# Patient Record
Sex: MALE | Race: WHITE | NOT HISPANIC OR LATINO | Employment: OTHER | URBAN - METROPOLITAN AREA
[De-identification: names, ages, dates, MRNs, and addresses within clinical notes are randomized per-mention and may not be internally consistent; named-entity substitution may affect disease eponyms.]

---

## 2018-10-04 ENCOUNTER — HOSPITAL ENCOUNTER (OUTPATIENT)
Dept: CT IMAGING | Facility: CLINIC | Age: 58
Discharge: HOME | End: 2018-10-04
Admitting: RADIOLOGY
Payer: COMMERCIAL

## 2018-10-04 DIAGNOSIS — Z82.49 FAMILY HISTORY OF HEART DISEASE: ICD-10-CM

## 2018-10-04 PROCEDURE — 75571 CT HRT W/O DYE W/CA TEST: CPT | Mod: GA

## 2018-10-04 PROCEDURE — 75571 CT HRT W/O DYE W/CA TEST: CPT | Mod: 26 | Performed by: INTERNAL MEDICINE

## 2021-10-01 ENCOUNTER — HOSPITAL ENCOUNTER (EMERGENCY)
Facility: CLINIC | Age: 61
Discharge: HOME | End: 2021-10-01
Attending: EMERGENCY MEDICINE | Admitting: EMERGENCY MEDICINE
Payer: COMMERCIAL

## 2021-10-01 VITALS
SYSTOLIC BLOOD PRESSURE: 140 MMHG | RESPIRATION RATE: 16 BRPM | TEMPERATURE: 98.5 F | OXYGEN SATURATION: 94 % | WEIGHT: 205 LBS | DIASTOLIC BLOOD PRESSURE: 97 MMHG | HEART RATE: 67 BPM

## 2021-10-01 DIAGNOSIS — I10 ESSENTIAL HYPERTENSION: ICD-10-CM

## 2021-10-01 DIAGNOSIS — R51.9 NONINTRACTABLE EPISODIC HEADACHE, UNSPECIFIED HEADACHE TYPE: ICD-10-CM

## 2021-10-01 LAB
ANION GAP SERPL CALCULATED.3IONS-SCNC: 6 MMOL/L (ref 3–14)
BASOPHILS # BLD AUTO: 0 10E3/UL (ref 0–0.2)
BASOPHILS NFR BLD AUTO: 1 %
BUN SERPL-MCNC: 11 MG/DL (ref 7–30)
CALCIUM SERPL-MCNC: 9.7 MG/DL (ref 8.5–10.1)
CHLORIDE BLD-SCNC: 100 MMOL/L (ref 94–109)
CO2 SERPL-SCNC: 29 MMOL/L (ref 20–32)
CREAT SERPL-MCNC: 0.94 MG/DL (ref 0.66–1.25)
EOSINOPHIL # BLD AUTO: 0 10E3/UL (ref 0–0.7)
EOSINOPHIL NFR BLD AUTO: 0 %
ERYTHROCYTE [DISTWIDTH] IN BLOOD BY AUTOMATED COUNT: 11.9 % (ref 10–15)
GFR SERPL CREATININE-BSD FRML MDRD: 88 ML/MIN/1.73M2
GLUCOSE BLD-MCNC: 183 MG/DL (ref 70–99)
HCT VFR BLD AUTO: 53.1 % (ref 40–53)
HGB BLD-MCNC: 18.6 G/DL (ref 13.3–17.7)
IMM GRANULOCYTES # BLD: 0 10E3/UL
IMM GRANULOCYTES NFR BLD: 1 %
LYMPHOCYTES # BLD AUTO: 1.3 10E3/UL (ref 0.8–5.3)
LYMPHOCYTES NFR BLD AUTO: 15 %
MCH RBC QN AUTO: 30 PG (ref 26.5–33)
MCHC RBC AUTO-ENTMCNC: 35 G/DL (ref 31.5–36.5)
MCV RBC AUTO: 86 FL (ref 78–100)
MONOCYTES # BLD AUTO: 0.4 10E3/UL (ref 0–1.3)
MONOCYTES NFR BLD AUTO: 5 %
NEUTROPHILS # BLD AUTO: 6.9 10E3/UL (ref 1.6–8.3)
NEUTROPHILS NFR BLD AUTO: 78 %
NRBC # BLD AUTO: 0 10E3/UL
NRBC BLD AUTO-RTO: 0 /100
PLATELET # BLD AUTO: 296 10E3/UL (ref 150–450)
POTASSIUM BLD-SCNC: 3.6 MMOL/L (ref 3.4–5.3)
RBC # BLD AUTO: 6.19 10E6/UL (ref 4.4–5.9)
SODIUM SERPL-SCNC: 135 MMOL/L (ref 133–144)
WBC # BLD AUTO: 8.7 10E3/UL (ref 4–11)

## 2021-10-01 PROCEDURE — 80048 BASIC METABOLIC PNL TOTAL CA: CPT | Performed by: EMERGENCY MEDICINE

## 2021-10-01 PROCEDURE — 93005 ELECTROCARDIOGRAM TRACING: CPT

## 2021-10-01 PROCEDURE — 85025 COMPLETE CBC W/AUTO DIFF WBC: CPT | Performed by: EMERGENCY MEDICINE

## 2021-10-01 PROCEDURE — 99284 EMERGENCY DEPT VISIT MOD MDM: CPT | Mod: 25

## 2021-10-01 PROCEDURE — 96375 TX/PRO/DX INJ NEW DRUG ADDON: CPT

## 2021-10-01 PROCEDURE — 250N000011 HC RX IP 250 OP 636: Performed by: EMERGENCY MEDICINE

## 2021-10-01 PROCEDURE — 96374 THER/PROPH/DIAG INJ IV PUSH: CPT

## 2021-10-01 PROCEDURE — 36415 COLL VENOUS BLD VENIPUNCTURE: CPT | Performed by: EMERGENCY MEDICINE

## 2021-10-01 RX ORDER — DIPHENHYDRAMINE HYDROCHLORIDE 50 MG/ML
25 INJECTION INTRAMUSCULAR; INTRAVENOUS ONCE
Status: COMPLETED | OUTPATIENT
Start: 2021-10-01 | End: 2021-10-01

## 2021-10-01 RX ORDER — METOCLOPRAMIDE HYDROCHLORIDE 5 MG/ML
10 INJECTION INTRAMUSCULAR; INTRAVENOUS ONCE
Status: COMPLETED | OUTPATIENT
Start: 2021-10-01 | End: 2021-10-01

## 2021-10-01 RX ORDER — METOCLOPRAMIDE 10 MG/1
10 TABLET ORAL 4 TIMES DAILY PRN
Qty: 10 TABLET | Refills: 0 | Status: SHIPPED | OUTPATIENT
Start: 2021-10-01

## 2021-10-01 RX ADMIN — METOCLOPRAMIDE HYDROCHLORIDE 10 MG: 5 INJECTION INTRAMUSCULAR; INTRAVENOUS at 15:30

## 2021-10-01 RX ADMIN — DIPHENHYDRAMINE HYDROCHLORIDE 25 MG: 50 INJECTION INTRAMUSCULAR; INTRAVENOUS at 15:29

## 2021-10-01 ASSESSMENT — ENCOUNTER SYMPTOMS
WEAKNESS: 0
NAUSEA: 1
VOMITING: 1
HEADACHES: 1
FEVER: 0
EYE PAIN: 0

## 2021-10-01 NOTE — ED TRIAGE NOTES
A&O x4.  ABC's intact.      Pt arrives with c/o high blood pressure & headache this morning at 0230 this morning.  Did take blood pressure medication.

## 2021-10-01 NOTE — ED PROVIDER NOTES
History     Chief Complaint:  Hypertension      The history is provided by the patient.      Akhil Downs is a 60 year old male who presents with hypertension. At 0230 this morning, the patient woke up to the gradual onset of a headache and it felt similar to prior episodes of hypertension related headaches. He notes that the previous headache episodes also occured at night. The headache is located on the forehead and posterior head. At bedside, he is still feeling the headache although less severe and mild nausea. Additionally, he had 2 episodes of vomiting since his headache started.  He denies any associated fevers, numbness, weakness, eye pain or jaw claudication.      He notes taking his blood pressure medications today. He was previously prescribed Atenolol, but was taken off this medication months prior. The patient admits to drinking 10 cups of coffee a day, but denies use of tobacco products or methamphetamine use. He also denies fever, eye pain, chest pain, breathing difficulties, or weakness to a particular area.       Review of Systems   Constitutional: Negative for fever.   Eyes: Negative for pain.   Cardiovascular: Negative for chest pain.   Gastrointestinal: Positive for nausea and vomiting.   Neurological: Positive for headaches. Negative for weakness.   All other systems reviewed and are negative.      Allergies:  Amlodipine  Atorvastatin  Contrast Dye  Lisinopril  Lovastatin  Pravastatin    Medications:    Tenormin   Hyzaar   Crestor   Viagra   Flomax     Past Medical History:    Type 2 diabetes mellitus   ED   Obesity   Elevated PSA   Dyslipidemia   BPH   Umbilical hernia   Essential HTN   Sleep apnea     Past Surgical History:    Appendectomy   Vasectomy     Family History:    Mother - seizure disorder   Brother - congenital heart defect     Social History:  Patient was accompanied to the ED with his wife.  Hx of alcohol use     Physical Exam     Patient Vitals for the past 24 hrs:   BP  Temp Temp src Pulse Resp SpO2 Weight   10/01/21 1600 -- -- -- -- -- 94 % --   10/01/21 1545 -- -- -- -- -- 96 % --   10/01/21 1538 (!) 156/104 -- -- 71 16 -- --   10/01/21 1530 (!) 156/104 -- -- 68 -- 97 % --   10/01/21 1359 (!) 162/109 98.5  F (36.9  C) Oral 68 18 96 % 93 kg (205 lb)       Physical Exam    HEENT:   Temporal arteries are non-tender.      Oropharynx is moist, without lesions or trismus.  Eyes:   PERRL.  EOMs intact.      No corneal clouding.   NECK:   Supple, no meningismus.       Negative Brudzinski's sign.  CV:    Regular rate and rhythm.    No murmurs, rubs or gallops.    No peripheral edema or unilateral leg swelling.  PULM:   Clear to auscultation bilateral.      No respiratory distress.      No stridor or wheezing.  ABD:  Soft, non-tender, non-distended.      No rebound or guarding.  MSK:    No gross deformity to all four extremities.      No significant joint effusions.  LYMPH:  No cervical lymphadenopathy.  NEURO:  A & O x 3    CN II-XII intact, speech is clear with no aphasia.      Finger to nose within normal limits.  No pronator drift.      Strength is 5/5 in all 4 extremities.  Sensation is intact.      Normal muscular tone, no tremor.  SKIN:   Warm, dry and intact.    PSYCH:   Mood is good and affect is appropriate.      Emergency Department Course   EC  ECG taken at 1407, ECG read at 1410  Normal sinus rhythm   Normal ECG   Rate 66 bpm. DE interval 174 ms. QRS duration 88 ms. QT/QTc 426/446 ms. P-R-T axes 53 37 12.     Laboratory:  CBC: WBC 8.7, HGB 18.6 (H),     BMP: Glucose 183 (H) o/w WNL (Creatinine 0.94)     Emergency Department Course:    Reviewed:  I reviewed nursing notes, vitals, past history and care everywhere    Assessments:  1430 I obtained history and examined the patient as noted above.   1613 I rechecked the patient and explained findings. I discussed plans for discharge home.      Interventions:  1529 Benadryl 10 mg IV   1530 Reglan 10 mg IV      Disposition:  The patient was discharged to home.    Impression & Plan    Medical Decision Makin-year-old male presented to the ED with gradual onset of headache in the presence of uncontrolled hypertension.  He has no features concerning for intracranial hemorrhage, mass, dural sinus thrombosis, meningitis, glaucoma, temporal arteritis.  History is most suggestive of migraine headache.  He had near complete resolution of his headache with interventions as described above.      In regards to his hypertension, he has no evidence of endorgan damage.  He is on max dose losartan/hydrochlorothiazide.  He was previously on atenolol.  I will have him reinitiate atenolol for improved blood pressure control.  He was also counseled on caffeine reduction as this is likely contributing to his poorly controlled hypertension.  Close follow-up with PCP and return to ED for any worsening symptoms.    Covid-19  Akhil Downs was evaluated during a global COVID-19 pandemic, which necessitated consideration that the patient might be at risk for infection with the SARS-CoV-2 virus that causes COVID-19.   Applicable protocols for evaluation were followed during the patient's care.       Diagnosis:    ICD-10-CM    1. Nonintractable episodic headache, unspecified headache type  R51.9    2. Essential hypertension  I10        Discharge Medications:  New Prescriptions    No medications on file         Scribe Disclosure:  I, Timo Scott, am serving as a scribe at 2:22 PM on 10/1/2021 to document services personally performed by Jayce Garcia MD based on my observations and the provider's statements to me.      Jayce Garcia MD  10/02/21 0858

## 2021-10-01 NOTE — DISCHARGE INSTRUCTIONS
Please restart your atenolol 50 mg once a day.  Please continue your other medications as previously prescribed.    Check your blood pressure twice a day and document these numbers.  When you follow-up with your primary care physician, please provide the physician with your blood pressures.    Discharge Instructions  Hypertension - High Blood Pressure    During you visit to the Emergency Department, your blood pressure was higher than the recommended blood pressure.  This may be related to stress, pain, medication or other temporary conditions. In these cases, your blood pressure may return to normal on its own. If you have a history of high blood pressure, you may need to have your provider adjust your medications. Sometimes, your high measurement here may indicate that you have developed high blood pressure that will stay high unless it is treated. As a general rule, high blood pressure causes problems over years rather than days, weeks, or months. So, while it is important to treat blood pressure, it is rarely important to treat blood pressure immediately. Occasionally we will begin a medication in the Emergency Department; more often we will recommend close follow-up for medications with a primary doctor/clinic.    Generally, every Emergency Department visit should have a follow-up clinic visit with either a primary or a specialty clinic/provider. Please follow-up as instructed by your emergency provider today.    Return to the Emergency Department if you start to have:  A severe headache.  Chest pain.  Shortness of breath.  Weakness or numbness that affects one part of the body.  Confusion.  Vision changes.  Significant swelling of legs and/or eyes.  A reaction to any medication started in the Emergency Department.    What can I do to help myself?  Avoid alcohol.  Take any blood pressure medicine that you are prescribed.  Get a good night s sleep.  Lower your salt intake.  Exercise.  Lose weight.  Manage  stress.  See your doctor regularly    If blood pressure medication was started in the Emergency Department:  The medicine may not have an immediate effect. The body and brain determine what blood pressure you have. The medicine s job is to retrain the body s  thermostat  to a lower blood pressure.  You will need to follow up with your provider to see how this medicine is working for you.  If you were given a prescription for medicine here today, be sure to read all of the information (including the package insert) that comes with your prescription.  This will include important information about the medicine, its side effects, and any warnings that you need to know about.  The pharmacist who fills the prescription can provide more information and answer questions you may have about the medicine.  If you have questions or concerns that the pharmacist cannot address, please call or return to the Emergency Department.   Remember that you can always come back to the Emergency Department if you are not able to see your regular provider in the amount of time listed above, if you get any new symptoms, or if there is anything that worries you.  Discharge Instructions  Headache    You were seen today for a headache. Headaches may be caused by many different things such as muscle tension, sinus inflammation, anxiety and stress, having too little sleep, too much alcohol, some medical conditions or injury. You may have a migraine, which is caused by changes in the blood vessels in your head.  At this time your provider does not find that your headache is a sign of anything dangerous or life-threatening.  However, sometimes the signs of serious illness do not show up right away.      Generally, every Emergency Department visit should have a follow-up clinic visit with either a primary or a specialty clinic/provider. Please follow-up as instructed by your emergency provider today.    Return to the Emergency Department if:  You get  a new fever of 100.4 F or higher.  Your headache gets much worse.  You get a stiff neck with your headache.  You get a new headache that is significantly different or worse than headaches you have had before.  You are vomiting (throwing up) and cannot keep food or water down.  You have blurry or double vision or other problems with your eyes.  You have a new weakness on one side of your body.  You have difficulty with balance which is new.  You or your family thinks you are confused.  You have a seizure.    What can I do to help myself?  Pain medications - You may take a pain medication such as Tylenol  (acetaminophen), Advil , Motrin  (ibuprofen) or Aleve  (naproxen).  Take a pain reliever as soon as you notice symptoms.  Starting medications as soon as you start to have symptoms may lessen the amount of pain you have.  Relaxing in a quiet, dark room may help.  Get enough sleep and eat meals regularly.  You may need to watch for certain foods or other things which may trigger your headaches.  Keeping a journal of your headaches and possible triggers may help you and your primary provider to identify things which you should avoid which may be causing your headaches.  If you were given a prescription for medicine here today, be sure to read all of the information (including the package insert) that comes with your prescription.  This will include important information about the medicine, its side effects, and any warnings that you need to know about.  The pharmacist who fills the prescription can provide more information and answer questions you may have about the medicine.  If you have questions or concerns that the pharmacist cannot address, please call or return to the Emergency Department.   Remember that you can always come back to the Emergency Department if you are not able to see your regular provider in the amount of time listed above, if you get any new symptoms, or if there is anything that worries  you.

## 2021-10-04 LAB
ATRIAL RATE - MUSE: 66 BPM
DIASTOLIC BLOOD PRESSURE - MUSE: NORMAL MMHG
INTERPRETATION ECG - MUSE: NORMAL
P AXIS - MUSE: 53 DEGREES
PR INTERVAL - MUSE: 174 MS
QRS DURATION - MUSE: 88 MS
QT - MUSE: 426 MS
QTC - MUSE: 446 MS
R AXIS - MUSE: 37 DEGREES
SYSTOLIC BLOOD PRESSURE - MUSE: NORMAL MMHG
T AXIS - MUSE: 12 DEGREES
VENTRICULAR RATE- MUSE: 66 BPM

## 2022-08-11 ENCOUNTER — HOSPITAL ENCOUNTER (EMERGENCY)
Facility: CLINIC | Age: 62
Discharge: HOME | End: 2022-08-12
Attending: EMERGENCY MEDICINE | Admitting: EMERGENCY MEDICINE
Payer: COMMERCIAL

## 2022-08-11 DIAGNOSIS — D72.829 LEUKOCYTOSIS, UNSPECIFIED TYPE: ICD-10-CM

## 2022-08-11 DIAGNOSIS — E86.0 DEHYDRATION: ICD-10-CM

## 2022-08-11 DIAGNOSIS — I95.9 HYPOTENSION, UNSPECIFIED HYPOTENSION TYPE: ICD-10-CM

## 2022-08-11 LAB
BASOPHILS # BLD AUTO: 0 10E3/UL (ref 0–0.2)
BASOPHILS NFR BLD AUTO: 0 %
EOSINOPHIL # BLD AUTO: 0 10E3/UL (ref 0–0.7)
EOSINOPHIL NFR BLD AUTO: 0 %
ERYTHROCYTE [DISTWIDTH] IN BLOOD BY AUTOMATED COUNT: 12 % (ref 10–15)
HCT VFR BLD AUTO: 48.9 % (ref 40–53)
HGB BLD-MCNC: 16.4 G/DL (ref 13.3–17.7)
IMM GRANULOCYTES # BLD: 0.1 10E3/UL
IMM GRANULOCYTES NFR BLD: 1 %
LYMPHOCYTES # BLD AUTO: 1.4 10E3/UL (ref 0.8–5.3)
LYMPHOCYTES NFR BLD AUTO: 7 %
MCH RBC QN AUTO: 28.9 PG (ref 26.5–33)
MCHC RBC AUTO-ENTMCNC: 33.5 G/DL (ref 31.5–36.5)
MCV RBC AUTO: 86 FL (ref 78–100)
MONOCYTES # BLD AUTO: 0.9 10E3/UL (ref 0–1.3)
MONOCYTES NFR BLD AUTO: 5 %
NEUTROPHILS # BLD AUTO: 16.2 10E3/UL (ref 1.6–8.3)
NEUTROPHILS NFR BLD AUTO: 87 %
NRBC # BLD AUTO: 0 10E3/UL
NRBC BLD AUTO-RTO: 0 /100
PLATELET # BLD AUTO: 273 10E3/UL (ref 150–450)
RBC # BLD AUTO: 5.67 10E6/UL (ref 4.4–5.9)
WBC # BLD AUTO: 18.7 10E3/UL (ref 4–11)

## 2022-08-11 PROCEDURE — 80048 BASIC METABOLIC PNL TOTAL CA: CPT | Performed by: EMERGENCY MEDICINE

## 2022-08-11 PROCEDURE — 93005 ELECTROCARDIOGRAM TRACING: CPT

## 2022-08-11 PROCEDURE — 258N000003 HC RX IP 258 OP 636: Performed by: EMERGENCY MEDICINE

## 2022-08-11 PROCEDURE — 96360 HYDRATION IV INFUSION INIT: CPT

## 2022-08-11 PROCEDURE — 99285 EMERGENCY DEPT VISIT HI MDM: CPT | Mod: 25

## 2022-08-11 PROCEDURE — 84484 ASSAY OF TROPONIN QUANT: CPT | Performed by: EMERGENCY MEDICINE

## 2022-08-11 PROCEDURE — 36415 COLL VENOUS BLD VENIPUNCTURE: CPT | Performed by: EMERGENCY MEDICINE

## 2022-08-11 PROCEDURE — 85025 COMPLETE CBC W/AUTO DIFF WBC: CPT | Performed by: EMERGENCY MEDICINE

## 2022-08-11 RX ADMIN — SODIUM CHLORIDE 1000 ML: 9 INJECTION, SOLUTION INTRAVENOUS at 23:28

## 2022-08-11 ASSESSMENT — ENCOUNTER SYMPTOMS
SHORTNESS OF BREATH: 0
LIGHT-HEADEDNESS: 1
NAUSEA: 1
DIZZINESS: 1

## 2022-08-11 ASSESSMENT — ACTIVITIES OF DAILY LIVING (ADL): ADLS_ACUITY_SCORE: 35

## 2022-08-12 ENCOUNTER — APPOINTMENT (OUTPATIENT)
Dept: GENERAL RADIOLOGY | Facility: CLINIC | Age: 62
End: 2022-08-12
Attending: EMERGENCY MEDICINE
Payer: COMMERCIAL

## 2022-08-12 VITALS
HEART RATE: 86 BPM | TEMPERATURE: 98.1 F | WEIGHT: 201.8 LBS | OXYGEN SATURATION: 94 % | RESPIRATION RATE: 18 BRPM | SYSTOLIC BLOOD PRESSURE: 106 MMHG | DIASTOLIC BLOOD PRESSURE: 50 MMHG

## 2022-08-12 LAB
ALBUMIN UR-MCNC: 70 MG/DL
ANION GAP SERPL CALCULATED.3IONS-SCNC: 10 MMOL/L (ref 7–15)
APPEARANCE UR: ABNORMAL
ATRIAL RATE - MUSE: 93 BPM
BILIRUB UR QL STRIP: NEGATIVE
BUN SERPL-MCNC: 17.5 MG/DL (ref 8–23)
CALCIUM SERPL-MCNC: 9.4 MG/DL (ref 8.8–10.2)
CAOX CRY #/AREA URNS HPF: ABNORMAL /HPF
CHLORIDE SERPL-SCNC: 99 MMOL/L (ref 98–107)
COLOR UR AUTO: YELLOW
CREAT SERPL-MCNC: 1.42 MG/DL (ref 0.67–1.17)
DEPRECATED HCO3 PLAS-SCNC: 25 MMOL/L (ref 22–29)
DIASTOLIC BLOOD PRESSURE - MUSE: NORMAL MMHG
GFR SERPL CREATININE-BSD FRML MDRD: 57 ML/MIN/1.73M2
GLUCOSE SERPL-MCNC: 209 MG/DL (ref 70–99)
GLUCOSE UR STRIP-MCNC: NEGATIVE MG/DL
HCO3 BLDV-SCNC: 28 MMOL/L (ref 21–28)
HGB UR QL STRIP: NEGATIVE
HYALINE CASTS: 20 /LPF
INTERPRETATION ECG - MUSE: NORMAL
KETONES UR STRIP-MCNC: 10 MG/DL
LACTATE BLD-SCNC: 1.4 MMOL/L
LEUKOCYTE ESTERASE UR QL STRIP: NEGATIVE
MUCOUS THREADS #/AREA URNS LPF: PRESENT /LPF
NITRATE UR QL: NEGATIVE
P AXIS - MUSE: 52 DEGREES
PCO2 BLDV: 44 MM HG (ref 40–50)
PH BLDV: 7.41 [PH] (ref 7.32–7.43)
PH UR STRIP: 6 [PH] (ref 5–7)
PO2 BLDV: 47 MM HG (ref 25–47)
POTASSIUM SERPL-SCNC: 4 MMOL/L (ref 3.4–5.3)
PR INTERVAL - MUSE: 166 MS
QRS DURATION - MUSE: 80 MS
QT - MUSE: 368 MS
QTC - MUSE: 457 MS
R AXIS - MUSE: 26 DEGREES
RBC URINE: 15 /HPF
SAO2 % BLDV: 82 % (ref 94–100)
SODIUM SERPL-SCNC: 134 MMOL/L (ref 136–145)
SP GR UR STRIP: 1.02 (ref 1–1.03)
SQUAMOUS EPITHELIAL: 1 /HPF
SYSTOLIC BLOOD PRESSURE - MUSE: NORMAL MMHG
T AXIS - MUSE: 18 DEGREES
TROPONIN T SERPL HS-MCNC: 33 NG/L
TROPONIN T SERPL HS-MCNC: 38 NG/L
UROBILINOGEN UR STRIP-MCNC: NORMAL MG/DL
VENTRICULAR RATE- MUSE: 93 BPM
WBC URINE: 7 /HPF

## 2022-08-12 PROCEDURE — 83605 ASSAY OF LACTIC ACID: CPT

## 2022-08-12 PROCEDURE — 87086 URINE CULTURE/COLONY COUNT: CPT | Performed by: EMERGENCY MEDICINE

## 2022-08-12 PROCEDURE — 71046 X-RAY EXAM CHEST 2 VIEWS: CPT

## 2022-08-12 PROCEDURE — 81001 URINALYSIS AUTO W/SCOPE: CPT | Performed by: EMERGENCY MEDICINE

## 2022-08-12 PROCEDURE — 36415 COLL VENOUS BLD VENIPUNCTURE: CPT | Performed by: EMERGENCY MEDICINE

## 2022-08-12 PROCEDURE — 84484 ASSAY OF TROPONIN QUANT: CPT | Performed by: EMERGENCY MEDICINE

## 2022-08-12 PROCEDURE — 82803 BLOOD GASES ANY COMBINATION: CPT

## 2022-08-12 ASSESSMENT — ACTIVITIES OF DAILY LIVING (ADL): ADLS_ACUITY_SCORE: 35

## 2022-08-12 NOTE — DISCHARGE INSTRUCTIONS
Hold you blood pressure medication tomorrow.  If blood pressure normalizes, may resume Saturday.  Drink plenty of fluids.  Gentle walking and normal daily activity is okay however is avoid strenuous exercise until cleared by your doctor.  Your white blood cell count was elevated.  If any symptoms of infection fever cough congestion abdominal pain recurrent low blood pressure, return immediately to the emergency department.  Follow-up closely with your primary care doctor regarding any adjustments in your blood pressure medication.    Discuss stress test with your doctor.

## 2022-08-12 NOTE — ED TRIAGE NOTES
Patient states he went on a bike ride tonight for 50mins and about 9miles.  Patient states he has had issues in the past with low blood pressure after bike riding. Tonight he felt lightheaded after his ride and took his blood pressure which was 58/43. In triage blood pressure 95/58. Patient feels mildly dizzy in triage.      Triage Assessment     Row Name 08/11/22 2031       Triage Assessment (Adult)    Airway WDL WDL       Respiratory WDL    Respiratory WDL WDL       Skin Circulation/Temperature WDL    Skin Circulation/Temperature WDL WDL       Cardiac WDL    Cardiac WDL WDL       Peripheral/Neurovascular WDL    Peripheral Neurovascular WDL WDL       Cognitive/Neuro/Behavioral WDL    Cognitive/Neuro/Behavioral WDL WDL

## 2022-08-12 NOTE — ED PROVIDER NOTES
History   Chief Complaint:  Hypotension       HPI   Akhil Downs is a 61 year old male with history of hypertension who presents with hypotension, dizziness, nausea, and lightheadedness after his bike ride today. He went on a 9.5 mile bike ride at 6:30pm today. During his bike ride he felt fine. This is a normal bike ride for him. When he was walking back a about 7:15pm he felt extremely exhausted. He measured his blood pressure and it was 50s/43 on his left arm. When he got home he had constant dizziness and lightheadedness with a little nausea. He denies trauma. He denies chest pain or shortness of breath during the bike ride or now. He has had this happen in the past after he stopped taking beta blockers. He is planning on loosing weight and wants to get his meds and blood pressure under control.   Patient had his wellness exam today.  He had fasted since last night for his laboratory studies.  Following his appointment, he was disappointed in his laboratory numbers and had only a few bites of food today.  He has been taking his losartan hydrochlorothiazide as prescribed for his blood pressure.  No recent medication changes.  No recent infectious symptoms.  Denies fever cough abdominal pain or urinary symptoms.  Recently had COVID infection however had recovered from this.    Review of Systems   Respiratory: Negative for shortness of breath.    Cardiovascular: Negative for chest pain.   Gastrointestinal: Positive for nausea.   Neurological: Positive for dizziness and light-headedness.   All other systems reviewed and are negative.        Allergies:  Amlodipine  Atorvastatin  Contrast Dye  Lisinopril  Lovastatin  Pravastatin    Medications:  Cialis  Flomax  Hyzaar  Crestor  Glucophage    Past Medical History:     DM 2  ED  Dyslipidemia  Benign prostatic hyperplasia  Umbilical hernia  Hypertension  GERD  Nephrolithiasis    Past Surgical History:    Appendectomy  Vasectomy    Family History:    Seizure  disorder  Congenital heart defect    Social History:  The patient presents to the ED with wife    Physical Exam     Patient Vitals for the past 24 hrs:   BP Temp Temp src Pulse Resp SpO2 Weight   08/12/22 0130 100/68 -- -- 86 -- 92 % --   08/12/22 0115 -- -- -- -- -- 94 % --   08/12/22 0045 -- -- -- -- -- 93 % --   08/12/22 0030 105/69 -- -- 84 -- 92 % --   08/12/22 0015 -- -- -- -- -- 94 % --   08/12/22 0000 97/66 -- -- 83 -- 93 % --   08/11/22 2150 110/71 -- -- 92 -- 99 % --   08/11/22 2030 95/58 98.1  F (36.7  C) Oral 94 18 95 % 91.5 kg (201 lb 12.8 oz)       Physical Exam  Gen: alert  HEENT: PERRL, oropharynx clear  Neck: normal ROM  CV: RRR, no murmurs, 2+ distal pulses in all 4 extremities  Chest: no tenderness over the chest wall  Pulm: breath sounds equal, lungs clear  Abd: Soft, nontender  Back: no evidence of injury  MSK: no lower extremity edema, no calf tenderness  Skin: no rash  Neuro: alert, appropriate conversation and interaction    Emergency Department Course   ECG  ECG taken at 2038, ECG read at 2222  Normal sinus rhythm  Nonspecific T wave abnormality   Rate 93 bpm. LA interval 166 ms. QRS duration 80 ms. QT/QTc 368/457 ms. P-R-T axes 52 26 18.     Imaging:  Chest XR,  PA & LAT   Final Result   IMPRESSION: Negative chest.        Report per radiology    Laboratory:  Labs Ordered and Resulted from Time of ED Arrival to Time of ED Departure   BASIC METABOLIC PANEL - Abnormal       Result Value    Creatinine 1.42 (*)     Sodium 134 (*)     Potassium 4.0      Urea Nitrogen 17.5      Chloride 99      Carbon Dioxide (CO2) 25      Anion Gap 10      Glucose 209 (*)     GFR Estimate 57 (*)     Calcium 9.4     TROPONIN T, HIGH SENSITIVITY - Abnormal    Troponin T, High Sensitivity 38 (*)    CBC WITH PLATELETS AND DIFFERENTIAL - Abnormal    WBC Count 18.7 (*)     RBC Count 5.67      Hemoglobin 16.4      Hematocrit 48.9      MCV 86      MCH 28.9      MCHC 33.5      RDW 12.0      Platelet Count 273      %  Neutrophils 87      % Lymphocytes 7      % Monocytes 5      % Eosinophils 0      % Basophils 0      % Immature Granulocytes 1      NRBCs per 100 WBC 0      Absolute Neutrophils 16.2 (*)     Absolute Lymphocytes 1.4      Absolute Monocytes 0.9      Absolute Eosinophils 0.0      Absolute Basophils 0.0      Absolute Immature Granulocytes 0.1      Absolute NRBCs 0.0     ROUTINE UA WITH MICROSCOPIC - Abnormal    Color Urine Yellow      Appearance Urine Slightly Cloudy (*)     Glucose Urine Negative      Bilirubin Urine Negative      Ketones Urine 10  (*)     Specific Gravity Urine 1.025      Blood Urine Negative      pH Urine 6.0      Protein Albumin Urine 70  (*)     Urobilinogen Urine Normal      Nitrite Urine Negative      Leukocyte Esterase Urine Negative      Mucus Urine Present (*)     Calcium Oxalate Crystals Urine Few (*)     RBC Urine 15 (*)     WBC Urine 7 (*)     Squamous Epithelials Urine 1      Hyaline Casts Urine 20 (*)    ISTAT GASES LACTATE VENOUS POCT - Abnormal    Lactic Acid POCT 1.4      Bicarbonate Venous POCT 28      O2 Sat, Venous POCT 82 (*)     pCO2V Venous POCT 44      pH Venous POCT 7.41      pO2 Venous POCT 47     TROPONIN T, HIGH SENSITIVITY - Abnormal    Troponin T, High Sensitivity 33 (*)    URINE CULTURE      Emergency Department Course:    Reviewed:  I reviewed nursing notes, vitals, past medical history and Care Everywhere    Assessments:  2220 I obtained history and examined the patient as noted above.     0117 I rechecked the patient and explained findings.     0143 I rechecked the patient. He is feeling better. I recommend a second liter of fluid. He declined this and understand the risk of infection.    Interventions:  2338 NaCl Bolus 1,000 mL IV    Disposition:  The patient was discharged to home.     Impression & Plan     Medical Decision Making:  Patient presents for low blood pressure.  EKG shows sinus rhythm without evidence of arrhythmia.  Troponin and delta troponin detectable  however he no significant delta change.  Patient did not have chest pain or shortness of breath.  Leukocytosis noted.  No overt infectious symptoms.  UA bland.  Chest x-ray clear.  Abdominal exam is completely benign.  I discussed with the patient that hypotension and leukocytosis can both be concerning for systemic infection.  Lactic acid here is normal.  I recommended cultures of the blood and urine.  Patient declined blood cultures.  He understands that this might miss a systemic infection and bloodstream infection.  Urine culture pending.  I suspect that the underlying cause of the patient's presentation is combination of underlying diuretic use with fasting followed by strenuous exercise.  Patient made only minimal urine after 1 L IV fluid.  I recommended he receive a second liter of normal saline however he declines this.  He is able to drink fluids by mouth.  Blood pressures have improved here with IV fluids to the 100s systolic.  He feels well.  I discussed with him my concern for occult infection and need to return for any worsening symptoms.  He expressed understanding.  I discussed my concern regarding detectable troponin and need for close follow-up with primary care and possible need for outpatient stress test.  Patient acknowledges this concern and states that he had a stress test this spring that was negative but will discuss with his doctor.  At this time, I recommended to the patient that he hold his blood pressure medication tomorrow.  Push fluids.  Avoid strenuous exercise until cleared by primary care.  Return to the emergency department for fever or any other infectious symptoms, chest pain, shortness of breath or fainting.  Patient declines further ED intervention or evaluation at this time.  He understands need for close blood pressure monitoring and follow-up and return to the emergency department any worsening.  Patient is alert and oriented and had detailed conversation discussion of these  recommendations and plans.  I do feel he is competent to make medical decisions for himself.  Discharge home      Diagnosis:    ICD-10-CM    1. Hypotension, unspecified hypotension type  I95.9    2. Dehydration  E86.0    3. Leukocytosis, unspecified type  D72.829        Discharge Medications: No new prescription  New Prescriptions    No medications on file       Scribe Disclosure:  I, Liborio Franco, am serving as a scribe at 9:51 PM on 8/11/2022 to document services personally performed by Megan Mullins MD based on my observations and the provider's statements to me.            Megan Mullins MD  08/12/22 0330

## 2022-08-13 LAB — BACTERIA UR CULT: NO GROWTH

## 2022-08-13 NOTE — RESULT ENCOUNTER NOTE
"Final urine culture report shows \"NO GROWTH\" and is NEGATIVE.  [facility] Emergency Dept discharge antibiotic: None  Recommendations in treatment per [facility] ED Lab result Urine culture protocol.  "

## 2023-10-01 ENCOUNTER — HOSPITAL ENCOUNTER (EMERGENCY)
Facility: CLINIC | Age: 63
Discharge: HOME | End: 2023-10-02
Attending: EMERGENCY MEDICINE | Admitting: EMERGENCY MEDICINE
Payer: COMMERCIAL

## 2023-10-01 ENCOUNTER — APPOINTMENT (OUTPATIENT)
Dept: GENERAL RADIOLOGY | Facility: CLINIC | Age: 63
End: 2023-10-01
Attending: EMERGENCY MEDICINE
Payer: COMMERCIAL

## 2023-10-01 DIAGNOSIS — R07.9 CHEST PAIN, UNSPECIFIED TYPE: ICD-10-CM

## 2023-10-01 PROCEDURE — 93005 ELECTROCARDIOGRAM TRACING: CPT

## 2023-10-01 PROCEDURE — 99285 EMERGENCY DEPT VISIT HI MDM: CPT | Mod: 25

## 2023-10-01 PROCEDURE — 71046 X-RAY EXAM CHEST 2 VIEWS: CPT

## 2023-10-01 RX ORDER — LOSARTAN POTASSIUM 100 MG/1
100 TABLET ORAL ONCE
Status: COMPLETED | OUTPATIENT
Start: 2023-10-01 | End: 2023-10-02

## 2023-10-01 ASSESSMENT — ACTIVITIES OF DAILY LIVING (ADL): ADLS_ACUITY_SCORE: 33

## 2023-10-02 VITALS
HEART RATE: 71 BPM | DIASTOLIC BLOOD PRESSURE: 102 MMHG | BODY MASS INDEX: 32.91 KG/M2 | WEIGHT: 209.66 LBS | SYSTOLIC BLOOD PRESSURE: 162 MMHG | HEIGHT: 67 IN | RESPIRATION RATE: 18 BRPM | OXYGEN SATURATION: 96 % | TEMPERATURE: 98 F

## 2023-10-02 LAB
ALBUMIN SERPL BCG-MCNC: 4 G/DL (ref 3.5–5.2)
ALP SERPL-CCNC: 103 U/L (ref 40–129)
ALT SERPL W P-5'-P-CCNC: 20 U/L (ref 0–70)
ANION GAP SERPL CALCULATED.3IONS-SCNC: 8 MMOL/L (ref 7–15)
AST SERPL W P-5'-P-CCNC: 14 U/L (ref 0–45)
ATRIAL RATE - MUSE: 68 BPM
BILIRUB SERPL-MCNC: 0.4 MG/DL
BUN SERPL-MCNC: 9.9 MG/DL (ref 8–23)
CALCIUM SERPL-MCNC: 8.9 MG/DL (ref 8.8–10.2)
CHLORIDE SERPL-SCNC: 104 MMOL/L (ref 98–107)
CREAT SERPL-MCNC: 1.07 MG/DL (ref 0.67–1.17)
DEPRECATED HCO3 PLAS-SCNC: 27 MMOL/L (ref 22–29)
DIASTOLIC BLOOD PRESSURE - MUSE: NORMAL MMHG
EGFRCR SERPLBLD CKD-EPI 2021: 79 ML/MIN/1.73M2
GLUCOSE SERPL-MCNC: 156 MG/DL (ref 70–99)
INTERPRETATION ECG - MUSE: NORMAL
MAGNESIUM SERPL-MCNC: 2.2 MG/DL (ref 1.7–2.3)
NT-PROBNP SERPL-MCNC: 53 PG/ML (ref 0–900)
P AXIS - MUSE: 44 DEGREES
POTASSIUM SERPL-SCNC: 3.5 MMOL/L (ref 3.4–5.3)
PR INTERVAL - MUSE: 158 MS
PROT SERPL-MCNC: 6.9 G/DL (ref 6.4–8.3)
QRS DURATION - MUSE: 82 MS
QT - MUSE: 416 MS
QTC - MUSE: 442 MS
R AXIS - MUSE: 12 DEGREES
SODIUM SERPL-SCNC: 139 MMOL/L (ref 135–145)
SYSTOLIC BLOOD PRESSURE - MUSE: NORMAL MMHG
T AXIS - MUSE: 5 DEGREES
TROPONIN T SERPL HS-MCNC: 10 NG/L
TROPONIN T SERPL HS-MCNC: 11 NG/L
VENTRICULAR RATE- MUSE: 68 BPM

## 2023-10-02 PROCEDURE — 84484 ASSAY OF TROPONIN QUANT: CPT | Performed by: EMERGENCY MEDICINE

## 2023-10-02 PROCEDURE — 36415 COLL VENOUS BLD VENIPUNCTURE: CPT | Performed by: EMERGENCY MEDICINE

## 2023-10-02 PROCEDURE — 250N000013 HC RX MED GY IP 250 OP 250 PS 637: Performed by: EMERGENCY MEDICINE

## 2023-10-02 PROCEDURE — 84484 ASSAY OF TROPONIN QUANT: CPT | Mod: 91 | Performed by: EMERGENCY MEDICINE

## 2023-10-02 PROCEDURE — 83880 ASSAY OF NATRIURETIC PEPTIDE: CPT | Performed by: EMERGENCY MEDICINE

## 2023-10-02 PROCEDURE — 83735 ASSAY OF MAGNESIUM: CPT | Performed by: EMERGENCY MEDICINE

## 2023-10-02 PROCEDURE — 80053 COMPREHEN METABOLIC PANEL: CPT | Performed by: EMERGENCY MEDICINE

## 2023-10-02 RX ADMIN — LOSARTAN POTASSIUM 100 MG: 100 TABLET, FILM COATED ORAL at 00:24

## 2023-10-02 ASSESSMENT — ACTIVITIES OF DAILY LIVING (ADL): ADLS_ACUITY_SCORE: 35

## 2023-10-02 NOTE — ED PROVIDER NOTES
"  History     Chief Complaint:  Hypertension and Chest Pain     The history is provided by the patient.      Akhil Downs is a 62 year old male with a history of hypertension and type 2 diabetes who presents to the emergency department for hypertension and chest pain. The patient states that this morning at 0300, he began experiencing onset of diffuse chest pain radiating to his back. He reports that he drank some tea and after an hour, his chest pain subsided. HE states that his symptoms returned an hour and a half ago accompanied by nausea. Denies his symptoms being exacerbated by exercise. He adds that he has a hx of hypertension in which he has not taken his Losartan since yesterday. Denies taking any medication for pain. Denies any hx of aortic stenosis or dissection. He notes that he has a family history of heart attacks and stroke. He adds that he received a heart test here at [facility] a while ago. Denies taking daily aspirin. Denies vomiting, numbness, tingling, weakness. Denies new leg swelling. Denies fatigue. He notes that today, his \"head felt pressurized.\" He notes he has type 2 diabetes.    Independent Historian:   None - Patient Only    Review of External Notes:   None     Medications:    Metoclopramide  Losartan    Past Medical History:    Type 2 diabetes  Erectile dysfunction  Obesity  Dyslipidemia  Tension headache  RAUL  Hypertension  Prostatic hyperplasia    Past Surgical History:    Appendectomy  Vasectomy      Physical Exam   No data found.     Physical Exam  Vitals reviewed.   Constitutional:       General: He is not in acute distress.     Appearance: He is not ill-appearing.   HENT:      Head: Normocephalic and atraumatic.   Eyes:      Extraocular Movements: Extraocular movements intact.   Cardiovascular:      Rate and Rhythm: Normal rate and regular rhythm.   Pulmonary:      Effort: Pulmonary effort is normal. No respiratory distress.      Breath sounds: Normal breath sounds. No " wheezing.   Abdominal:      Palpations: Abdomen is soft.      Tenderness: There is no abdominal tenderness. There is no guarding.   Musculoskeletal:      Cervical back: Normal range of motion.   Skin:     General: Skin is warm and dry.   Neurological:      Mental Status: He is alert and oriented to person, place, and time.      GCS: GCS eye subscore is 4. GCS verbal subscore is 5. GCS motor subscore is 6.   Psychiatric:         Behavior: Behavior normal.           Emergency Department Course   ECG  ECG taken at 2246, ECG read at 2256  Normal sinus rhythm  No STEMI   No previous ECG available.  Rate 68 bpm. NH interval 158 ms. QRS duration 82 ms. QT/QTc 416/442 ms. P-R-T axes 44 12 5.     Imaging:  XR Chest 2 Views   Final Result   IMPRESSION: Negative chest.      NM Exercise stress test (nuc card)    (Results Pending)      Report per radiology    Laboratory:  Labs Ordered and Resulted from Time of ED Arrival to Time of ED Departure   COMPREHENSIVE METABOLIC PANEL - Abnormal       Result Value    Sodium 139      Potassium 3.5      Carbon Dioxide (CO2) 27      Anion Gap 8      Urea Nitrogen 9.9      Creatinine 1.07      GFR Estimate 79      Calcium 8.9      Chloride 104      Glucose 156 (*)     Alkaline Phosphatase 103      AST 14      ALT 20      Protein Total 6.9      Albumin 4.0      Bilirubin Total 0.4     NT PROBNP INPATIENT - Normal    N terminal Pro BNP Inpatient 53     MAGNESIUM - Normal    Magnesium 2.2     TROPONIN T, HIGH SENSITIVITY - Normal    Troponin T, High Sensitivity 10     TROPONIN T, HIGH SENSITIVITY - Normal    Troponin T, High Sensitivity 11          Procedures       Emergency Department Course & Assessments:    Interventions:  Medications   losartan (COZAAR) tablet 100 mg (100 mg Oral $Given 10/2/23 0024)        Assessments:  2305 I obtained history and examined the patient as noted above.     Independent Interpretation (X-rays, CTs, rhythm strip):  None    Consultations/Discussion of Management  or Tests:  None   ED Course as of 10/06/23 1235   Mon Oct 02, 2023   0112 Reassessed the patient updated him on results and plan of care at this time.  Blood pressure is improving.  States he has no chest pain at this time.       Social Determinants of Health affecting care:   None    Disposition:  The patient was discharged to home.     Impression & Plan        Medical Decision Makin-year-old male presented today with chest pain.  He reports prior history of hypertension.  No history of CAD.  Reports family history of CAD.  He states that he had episode of chest pain prior to arrival.  Currently asymptomatic.  Initial troponin was 10.  Repeat was 11.  EKG was normal sinus.  Patient continued be well-appearing in no acute distress.  He stable for discharge.  I did offer him admission to the hospital given his significant family history.  He states that he will follow-up with cardiology as an outpatient.  Referral was placed at this time.  Discussed strict return precautions including any recurrent chest pain or any concerning symptoms. He agrees with plan of care. All questions and concerns addressed at this time.     Diagnosis:    ICD-10-CM    1. Chest pain, unspecified type  R07.9 Follow-Up with Cardiology     NM Exercise stress test (nuc card)           Discharge Medications:  Discharge Medication List as of 10/2/2023  2:55 AM         Scribe Disclosure:  I, Abhilash Cook, am serving as a scribe at 11:16 PM on 10/1/2023 to document services personally performed by Jeny Huggins DO based on my observations and the provider's statements to me.     10/1/2023   Jeny Huggins DO Doan, Tiffani, DO  10/06/23 1236

## 2023-10-02 NOTE — DISCHARGE INSTRUCTIONS
Referral was placed for cardiology as well as an outpatient stress test.  Please follow-up.  Please return to the ER for any recurrence of chest pain or worsening pain.    Discharge Instructions  Chest Pain    You have been seen today for chest pain or discomfort.  At this time, your provider has found no signs that your chest pain is due to a serious or life-threatening condition, (or you have declined more testing and/or admission to the hospital). However, sometimes there is a serious problem that does not show up right away. Your evaluation today may not be complete and you may need further testing and evaluation.     Generally, every Emergency Department visit should have a follow-up clinic visit with either a primary or a specialty clinic/provider. Please follow-up as instructed by your emergency provider today.  Return to the Emergency Department if:  Your chest pain changes, gets worse, starts to happen more often, or comes with less activity.  You are newly short of breath.  You get very weak or tired.  You pass out or faint.  You have any new symptoms, like fever, cough, numb legs, or you cough up blood.  You have anything else that worries you.    Until you follow-up with your regular provider, please do the following:  Take one aspirin daily unless you have an allergy or are told not to by your provider.  If a stress test appointment has been made, go to the appointment.  If you have questions, contact your regular provider.  Follow-up with your regular provider/clinic as directed; this is very important.    If you were given a prescription for medicine here today, be sure to read all of the information (including the package insert) that comes with your prescription.  This will include important information about the medicine, its side effects, and any warnings that you need to know about.  The pharmacist who fills the prescription can provide more information and answer questions you may have about the  medicine.  If you have questions or concerns that the pharmacist cannot address, please call or return to the Emergency Department.       Remember that you can always come back to the Emergency Department if you are not able to see your regular provider in the amount of time listed above, if you get any new symptoms, or if there is anything that worries you.

## 2023-10-02 NOTE — ED NOTES
"Attempted line. Did not even finish puncturing the skin and patient started reporting \"Your not in the right spot\" asked patient to focus on breathing and allow staff to continue, patient refused, \"Your digging around!\".   Patient picking spots that patient could attempt. \"This is wear they always go.\" Attempted to educate patient without success.   Refused line. Unable to obtain blood.  "

## 2023-10-02 NOTE — ED TRIAGE NOTES
Pt arrives to the ED due to having right sided chest pain that goes through to his back that began 1 hr PTA. Pt states he had an episode of pain that began around 0100 that woke him up but eventually went away. Pt states that his BP was also elevated at home, 192/116. Takes BP meds, missed dose this AM. Some diaphoresis. No SOB or dizziness. Pain currently 5/10.

## 2024-09-17 ENCOUNTER — HOSPITAL ENCOUNTER (EMERGENCY)
Facility: CLINIC | Age: 64
Discharge: HOME | End: 2024-09-17
Attending: STUDENT IN AN ORGANIZED HEALTH CARE EDUCATION/TRAINING PROGRAM | Admitting: STUDENT IN AN ORGANIZED HEALTH CARE EDUCATION/TRAINING PROGRAM

## 2024-09-17 VITALS
SYSTOLIC BLOOD PRESSURE: 177 MMHG | WEIGHT: 211.2 LBS | DIASTOLIC BLOOD PRESSURE: 112 MMHG | TEMPERATURE: 98.2 F | RESPIRATION RATE: 18 BRPM | OXYGEN SATURATION: 92 % | BODY MASS INDEX: 33.08 KG/M2 | HEART RATE: 72 BPM

## 2024-09-17 DIAGNOSIS — R51.9 ACUTE NONINTRACTABLE HEADACHE, UNSPECIFIED HEADACHE TYPE: ICD-10-CM

## 2024-09-17 DIAGNOSIS — I10 HYPERTENSION, UNSPECIFIED TYPE: ICD-10-CM

## 2024-09-17 LAB
ANION GAP SERPL CALCULATED.3IONS-SCNC: 16 MMOL/L (ref 7–15)
BASOPHILS # BLD AUTO: 0 10E3/UL (ref 0–0.2)
BASOPHILS NFR BLD AUTO: 0 %
BUN SERPL-MCNC: 11.8 MG/DL (ref 8–23)
CALCIUM SERPL-MCNC: 9.8 MG/DL (ref 8.8–10.4)
CHLORIDE SERPL-SCNC: 95 MMOL/L (ref 98–107)
CREAT SERPL-MCNC: 1.13 MG/DL (ref 0.67–1.17)
EGFRCR SERPLBLD CKD-EPI 2021: 73 ML/MIN/1.73M2
EOSINOPHIL # BLD AUTO: 0.1 10E3/UL (ref 0–0.7)
EOSINOPHIL NFR BLD AUTO: 1 %
ERYTHROCYTE [DISTWIDTH] IN BLOOD BY AUTOMATED COUNT: 12.1 % (ref 10–15)
GLUCOSE SERPL-MCNC: 179 MG/DL (ref 70–99)
HCO3 SERPL-SCNC: 24 MMOL/L (ref 22–29)
HCT VFR BLD AUTO: 51.9 % (ref 40–53)
HGB BLD-MCNC: 18 G/DL (ref 13.3–17.7)
HOLD SPECIMEN: NORMAL
IMM GRANULOCYTES # BLD: 0 10E3/UL
IMM GRANULOCYTES NFR BLD: 0 %
LYMPHOCYTES # BLD AUTO: 1.8 10E3/UL (ref 0.8–5.3)
LYMPHOCYTES NFR BLD AUTO: 20 %
MCH RBC QN AUTO: 28.6 PG (ref 26.5–33)
MCHC RBC AUTO-ENTMCNC: 34.7 G/DL (ref 31.5–36.5)
MCV RBC AUTO: 82 FL (ref 78–100)
MONOCYTES # BLD AUTO: 0.5 10E3/UL (ref 0–1.3)
MONOCYTES NFR BLD AUTO: 6 %
NEUTROPHILS # BLD AUTO: 6.6 10E3/UL (ref 1.6–8.3)
NEUTROPHILS NFR BLD AUTO: 73 %
NRBC # BLD AUTO: 0 10E3/UL
NRBC BLD AUTO-RTO: 0 /100
PLATELET # BLD AUTO: 323 10E3/UL (ref 150–450)
POTASSIUM SERPL-SCNC: 3.8 MMOL/L (ref 3.4–5.3)
RBC # BLD AUTO: 6.3 10E6/UL (ref 4.4–5.9)
SODIUM SERPL-SCNC: 135 MMOL/L (ref 135–145)
WBC # BLD AUTO: 9.1 10E3/UL (ref 4–11)

## 2024-09-17 PROCEDURE — 258N000003 HC RX IP 258 OP 636: Performed by: STUDENT IN AN ORGANIZED HEALTH CARE EDUCATION/TRAINING PROGRAM

## 2024-09-17 PROCEDURE — 96360 HYDRATION IV INFUSION INIT: CPT

## 2024-09-17 PROCEDURE — 85025 COMPLETE CBC W/AUTO DIFF WBC: CPT | Performed by: STUDENT IN AN ORGANIZED HEALTH CARE EDUCATION/TRAINING PROGRAM

## 2024-09-17 PROCEDURE — 99284 EMERGENCY DEPT VISIT MOD MDM: CPT | Mod: 25

## 2024-09-17 PROCEDURE — 93005 ELECTROCARDIOGRAM TRACING: CPT

## 2024-09-17 PROCEDURE — 36415 COLL VENOUS BLD VENIPUNCTURE: CPT | Performed by: STUDENT IN AN ORGANIZED HEALTH CARE EDUCATION/TRAINING PROGRAM

## 2024-09-17 PROCEDURE — 80048 BASIC METABOLIC PNL TOTAL CA: CPT | Performed by: STUDENT IN AN ORGANIZED HEALTH CARE EDUCATION/TRAINING PROGRAM

## 2024-09-17 RX ADMIN — SODIUM CHLORIDE 1000 ML: 9 INJECTION, SOLUTION INTRAVENOUS at 21:05

## 2024-09-17 ASSESSMENT — COLUMBIA-SUICIDE SEVERITY RATING SCALE - C-SSRS
2. HAVE YOU ACTUALLY HAD ANY THOUGHTS OF KILLING YOURSELF IN THE PAST MONTH?: NO
1. IN THE PAST MONTH, HAVE YOU WISHED YOU WERE DEAD OR WISHED YOU COULD GO TO SLEEP AND NOT WAKE UP?: NO
6. HAVE YOU EVER DONE ANYTHING, STARTED TO DO ANYTHING, OR PREPARED TO DO ANYTHING TO END YOUR LIFE?: NO

## 2024-09-17 ASSESSMENT — ACTIVITIES OF DAILY LIVING (ADL)
ADLS_ACUITY_SCORE: 35
ADLS_ACUITY_SCORE: 35

## 2024-09-18 LAB
ATRIAL RATE - MUSE: 86 BPM
DIASTOLIC BLOOD PRESSURE - MUSE: NORMAL MMHG
INTERPRETATION ECG - MUSE: NORMAL
P AXIS - MUSE: 52 DEGREES
PR INTERVAL - MUSE: 162 MS
QRS DURATION - MUSE: 84 MS
QT - MUSE: 380 MS
QTC - MUSE: 454 MS
R AXIS - MUSE: 38 DEGREES
SYSTOLIC BLOOD PRESSURE - MUSE: NORMAL MMHG
T AXIS - MUSE: 25 DEGREES
VENTRICULAR RATE- MUSE: 86 BPM

## 2024-09-18 NOTE — DISCHARGE INSTRUCTIONS
Return to the emergency department if symptoms are worsening, become concerning, or for any other concerns. Follow-up with your doctor in 2-3 and sooner if needed.    Discharge Instructions  Hypertension - High Blood Pressure    During you visit to the Emergency Department, your blood pressure was higher than the recommended blood pressure.  This may be related to stress, pain, medication or other temporary conditions. In these cases, your blood pressure may return to normal on its own. If you have a history of high blood pressure, you may need to have your provider adjust your medications. Sometimes, your high measurement here may indicate that you have developed high blood pressure that will stay high unless it is treated. As a general rule, high blood pressure causes problems over years rather than days, weeks, or months. So, while it is important to treat blood pressure, it is rarely important to treat blood pressure immediately. Occasionally we will begin a medication in the Emergency Department; more often we will recommend close follow-up for medications with a primary doctor/clinic.    Generally, every Emergency Department visit should have a follow-up clinic visit with either a primary or a specialty clinic/provider. Please follow-up as instructed by your emergency provider today.    Return to the Emergency Department if you start to have:  A severe headache.  Chest pain.  Shortness of breath.  Weakness or numbness that affects one part of the body.  Confusion.  Vision changes.  Significant swelling of legs and/or eyes.  A reaction to any medication started in the Emergency Department.    What can I do to help myself?  Avoid alcohol.  Take any blood pressure medicine that you are prescribed.  Get a good night s sleep.  Lower your salt intake.  Exercise.  Lose weight.  Manage stress.  See your doctor regularly    If blood pressure medication was started in the Emergency Department:  The medicine may not have  an immediate effect. The body and brain determine what blood pressure you have. The medicine s job is to retrain the body s  thermostat  to a lower blood pressure.  You will need to follow up with your provider to see how this medicine is working for you.  If you were given a prescription for medicine here today, be sure to read all of the information (including the package insert) that comes with your prescription.  This will include important information about the medicine, its side effects, and any warnings that you need to know about.  The pharmacist who fills the prescription can provide more information and answer questions you may have about the medicine.  If you have questions or concerns that the pharmacist cannot address, please call or return to the Emergency Department.   Remember that you can always come back to the Emergency Department if you are not able to see your regular provider in the amount of time listed above, if you get any new symptoms, or if there is anything that worries you.

## 2024-09-18 NOTE — ED PROVIDER NOTES
Emergency Department Note      History of Present Illness     Chief Complaint   Hypertension      HPI   Akhil Downs is a 63 year old male with a history of type II diabetes, HTN, and dyslipidemia who presents to the ED for the evaluation of hypertension. The patient reports that he developed a headache in the morning that worsened throughout the day, becoming excruciating around  today. It has improved since he took Tylenol and ibuprofen. He got home and checked his blood pressure, finding it to be 213/118. He also had chills. When he arrived at the ED, he vomited.  Which he thinks is secondary to his stomach being upset from something he ate earlier. He has been on a blood pressure medication for 3 years but usually doesn't take it in the summer because he experiences hypotension with activity, so he had not taken his medication for 3 months before finally taking it again last night. He presented to ED a year ago for a headache but did not have vomiting. On his medication, he usually has systolic around 120, but without his meds it is 150. Denies fever, chest pain, shortness of breath, abdominal pain, slurred speech, numbness, head trauma, or neck cracking/manipulation by a chiropractor. Denies smoking or drinking.    Independent Historian   None    Review of External Notes   none    Past Medical History     Medical History and Problem List   DM type II  ED  HTN  Dyslipidemia  RAUL  Obesity    Medications   losartan-hydrochlorothiazide  metformin  rosuvastatin  tadalafil  tamsulosin  metoclopramide    Surgical History   Appendectomy  Vasectomy  Wrist procedure    Physical Exam     Patient Vitals for the past 24 hrs:   BP Temp Temp src Pulse Resp SpO2 Weight   09/17/24 2200 (!) 177/112 -- -- 72 -- 92 % --   09/17/24 2145 (!) 172/108 -- -- 70 -- 94 % --   09/17/24 2130 (!) 196/130 -- -- 86 -- 92 % --   09/17/24 2115 (!) 202/122 -- -- 74 -- 90 % --   09/17/24 2100 (!) 187/129 -- -- 81 -- 92 % --    09/17/24 2045 (!) 176/102 -- -- 75 -- 93 % --   09/17/24 2030 (!) 177/107 -- -- -- -- 94 % --   09/17/24 2015 (!) 206/130 98.2  F (36.8  C) Temporal 87 18 97 % 95.8 kg (211 lb 3.2 oz)     Physical Exam  GENERAL: Patient well-appearing  HEAD: Atraumatic.  EYES: Anicteric. PERRL  NOSE: No active bleeding  MOUTH: Moist mucosa  THROAT: Patent airway.   NECK: No rigidity  CV: RRR, no murmurs, rubs or gallops  PULM: CTAB with good aeration; no retractions, rales, rhonchi, or wheezing  ABD: Soft, nontender, nondistended, no guarding, no peritoneal signs   DERM: No rash. Skin warm and dry  EXTREMITY: Moving all extremities without difficulty. No calf tenderness or peripheral edema  VASCULAR: Symmetric pulses bilaterally  NEURO: A,Ox3. CN 2-12 fully intact. Strength 5/5 bilateral LE/UE. Sensation fully intact to light touch symmetrically bilateral LE/UE. Normal finger-to-nose and heel to shin. No nystagmus. No ataxia.    Diagnostics     Lab Results   Labs Ordered and Resulted from Time of ED Arrival to Time of ED Departure   BASIC METABOLIC PANEL - Abnormal       Result Value    Sodium 135      Potassium 3.8      Chloride 95 (*)     Carbon Dioxide (CO2) 24      Anion Gap 16 (*)     Urea Nitrogen 11.8      Creatinine 1.13      GFR Estimate 73      Calcium 9.8      Glucose 179 (*)    CBC WITH PLATELETS AND DIFFERENTIAL - Abnormal    WBC Count 9.1      RBC Count 6.30 (*)     Hemoglobin 18.0 (*)     Hematocrit 51.9      MCV 82      MCH 28.6      MCHC 34.7      RDW 12.1      Platelet Count 323      % Neutrophils 73      % Lymphocytes 20      % Monocytes 6      % Eosinophils 1      % Basophils 0      % Immature Granulocytes 0      NRBCs per 100 WBC 0      Absolute Neutrophils 6.6      Absolute Lymphocytes 1.8      Absolute Monocytes 0.5      Absolute Eosinophils 0.1      Absolute Basophils 0.0      Absolute Immature Granulocytes 0.0      Absolute NRBCs 0.0         Imaging   No orders to display       EKG   ECG taken at 2025, ECG  read at 2027  Sinus rhythm  Possible left atrial enlargement  Nonspecific T wave abnormality  NO STEMI. No STD.  Nonspecific T wave abnormality now evident in anterior leads as compared to prior, dated 10/01/2023.  Rate 86 bpm. MT interval 162 ms. QRS duration 84 ms. QT/QTc 380/454 ms. P-R-T axes 52 38 25.    Independent Interpretation   None    ED Course      Medications Administered   Medications   prochlorperazine (COMPAZINE) injection 10 mg (0 mg Intravenous Hold 9/17/24 2105)   sodium chloride 0.9% BOLUS 1,000 mL (0 mLs Intravenous Stopped 9/17/24 2212)       Procedures   Procedures     Discussion of Management   None    ED Course   ED Course as of 09/17/24 2219   Tue Sep 17, 2024   2051 I evaluated the patient and obtained history.        Additional Documentation  None    Medical Decision Making / Diagnosis        DELORES   Akhil Downs is a 63 year old male     Patient with poorly controlled hypertension likely secondary to medication noncompliance.    Chronic conditions complicating - HTN     DDx considered hypertensive emergency, CVA, MI, however evaluation not consistent with these etiologies.    Considered emergent anti-hypertensives, but without evidence of end organ damage - emergent lowering of blood pressure not indicated. [facility] clinical policy supports this.     Basic labs showing slight erythrocytosis.  Recommend he stay well-hydrated.  Given IV fluids here.    Patient's headache was gradual in onset.  He has no neurologic deficit.  No neck stiffness.  No vision changes.  He took ibuprofen and Tylenol he feels much better now.  He does not feel that he needs any medications here in the ED.  Do not think he requires advanced brain imaging.    I recommend he take his blood pressure medication which she has plenty at home.  Counseled on lifestyle modification.    I have evaluated the patient for acute medical emergencies and have clinically decided no further acute medical interventions are required.  Reassessed multiple times and improving. Patient stable for discharge. All questions answered. Given strict return precautions. Patient content with plan. The differential diagnosis and treatment modalities were discussed thoroughly with the patient. Recommended PCP follow-up in 2-3 days.        Disposition   The patient was discharged.     Diagnosis     ICD-10-CM    1. Hypertension, unspecified type  I10       2. Acute nonintractable headache, unspecified headache type  R51.9            Discharge Medications   Discharge Medication List as of 9/17/2024 10:13 PM            Scribe Disclosure:  I, Stephanie Abdi, am serving as a scribe at 9:16 PM on 9/17/2024 to document services personally performed by Negrito Iglesias MD based on my observations and the provider's statements to me.        Negrito Iglesias MD  09/17/24 [id number]

## 2024-09-18 NOTE — ED TRIAGE NOTES
Pt here with c/o HTN and headaches. Pt states he had a headache all day, took his BP at home and it was 200s/100s. Hx HLD, HTN, and DM2. Pt vomited in triage